# Patient Record
Sex: FEMALE | Race: AMERICAN INDIAN OR ALASKA NATIVE
[De-identification: names, ages, dates, MRNs, and addresses within clinical notes are randomized per-mention and may not be internally consistent; named-entity substitution may affect disease eponyms.]

---

## 2017-07-18 NOTE — EMERGENCY DEPARTMENT REPORT
ED ENT HPI





- General


Chief complaint: Dental/Oral


Stated complaint: L SIDE FACIAL PAIN/TOOTHACHE


Time Seen by Provider: 17 05:05


Source: patient


Mode of arrival: Ambulatory


Limitations: No Limitations





- History of Present Illness


Initial comments: 


26-year-old femalesignificant past medical history presents complaining of 

bilateral upper and lower toothache.  Patient states that she went to a dentist 

and was told that she has 4 wisdom teeth that need to be extracted.  Patient 

states she is taking Motrin 800 mg with minimal relief of her discomfort.  

Patient states she was prescribed amoxicillin today by her dentist.  Patient 

states that she has an appointment in September to have wisdom teeth extracted.

  Denies any pus or blood drainage from mouth states that pain is currently 7 

out of 10.  Patient speaking in full sentences.


MD complaint: tooth pain


Onset/Timin


-: week(s)


Location: tooth # (b/l upper and lower wisdom teeth. Pain behin teeth # 1. 16, 

32, 17)


Severity: moderate


Severity scale (0 -10): 6


Quality: aching


Worsens with: eating


Context- Dental: other (wisdom teeth)





- Related Data


 Previous Rx's











 Medication  Instructions  Recorded  Last Taken  Type


 


Sulfamethoxazole/Trimethoprim 1 each PO Q12H #14 tablet 14 Unknown Rx





[Bactrim Ds]    


 


Acetaminophen/Codeine [Tylenol 1 tab PO Q6H PRN #14 tab 17 Unknown Rx





/Codeine # 3 tab]    


 


Chlorhexidine Mouthwash [Peridex] 15 ml MM BID #1 bottle 17 Unknown Rx











 Allergies











Allergy/AdvReac Type Severity Reaction Status Date / Time


 


No Known Allergies Allergy   Unverified 14 13:35














ED Dental HPI





- General


Chief complaint: Dental/Oral


Stated complaint: L SIDE FACIAL PAIN/TOOTHACHE


Time Seen by Provider: 17 05:05


Source: patient


Mode of arrival: Ambulatory


Limitations: No Limitations





- Related Data


 Previous Rx's











 Medication  Instructions  Recorded  Last Taken  Type


 


Sulfamethoxazole/Trimethoprim 1 each PO Q12H #14 tablet 14 Unknown Rx





[Bactrim Ds]    


 


Acetaminophen/Codeine [Tylenol 1 tab PO Q6H PRN #14 tab 17 Unknown Rx





/Codeine # 3 tab]    


 


Chlorhexidine Mouthwash [Peridex] 15 ml MM BID #1 bottle 17 Unknown Rx











 Allergies











Allergy/AdvReac Type Severity Reaction Status Date / Time


 


No Known Allergies Allergy   Unverified 14 13:35














ED Review of Systems


ROS: 


Stated complaint: L SIDE FACIAL PAIN/TOOTHACHE


Other details as noted in HPI





Constitutional: denies: chills, fever


Eyes: denies: eye pain, eye discharge, vision change


ENT: dental pain.  denies: ear pain, throat pain


Respiratory: denies: cough, shortness of breath, wheezing


Cardiovascular: denies: chest pain, palpitations


Endocrine: no symptoms reported


Gastrointestinal: denies: abdominal pain, nausea, diarrhea


Genitourinary: denies: urgency, dysuria, discharge


Musculoskeletal: denies: back pain, joint swelling, arthralgia


Skin: denies: rash, lesions


Neurological: denies: headache, weakness, paresthesias


Psychiatric: denies: anxiety, depression


Hematological/Lymphatic: denies: easy bleeding, easy bruising





ED Past Medical Hx





- Past Medical History


Previous Medical History?: No





- Surgical History


Past Surgical History?: Yes


Additional Surgical History:  





- Social History


Smoking Status: Unknown if ever smoked





- Medications


Home Medications: 


 Home Medications











 Medication  Instructions  Recorded  Confirmed  Last Taken  Type


 


Sulfamethoxazole/Trimethoprim 1 each PO Q12H #14 tablet 14  Unknown Rx





[Bactrim Ds]     


 


Acetaminophen/Codeine [Tylenol 1 tab PO Q6H PRN #14 tab 17  Unknown Rx





/Codeine # 3 tab]     


 


Chlorhexidine Mouthwash [Peridex] 15 ml MM BID #1 bottle 17  Unknown Rx














ED Physical Exam





- General


Limitations: No Limitations


General appearance: alert, in no apparent distress





- Head


Head exam: Present: atraumatic, normocephalic





- Eye


Eye exam: Present: normal appearance, PERRL, EOMI





- ENT


ENT exam: Present: mucous membranes moist





- Expanded ENT Exam


  ** Expanded


Teeth exam: Present: dental tenderness # (pain behind all 4 molars)


Throat exam: Positive: normal inspection





- Neck


Neck exam: Present: normal inspection, full ROM





- Respiratory


Respiratory exam: Present: normal lung sounds bilaterally.  Absent: respiratory 

distress





- Cardiovascular


Cardiovascular Exam: Present: regular rate, normal rhythm.  Absent: systolic 

murmur, diastolic murmur, rubs, gallop





- GI/Abdominal


GI/Abdominal exam: Present: soft, normal bowel sounds





- Extremities Exam


Extremities exam: Present: normal inspection





- Back Exam


Back exam: Present: normal inspection





- Neurological Exam


Neurological exam: Present: alert, oriented X3





- Psychiatric


Psychiatric exam: Present: normal affect, normal mood





- Skin


Skin exam: Present: warm, dry, intact, normal color.  Absent: rash





ED Course





 Vital Signs











  17





  00:45


 


Temperature 98.5 F


 


Pulse Rate 77


 


Respiratory 16





Rate 


 


Blood Pressure 125/86


 


O2 Sat by Pulse 98





Oximetry 














ED Medical Decision Making





- Medical Decision Making


A/P: Toothache, wisdom teeth impaction


1-patient states she was ordered given a prescription for 10 day course of 

amoxicillin by her dentist


2-patient is taking 800 mg Motrin with minimal relief of her pain, will 

prescribe short course of Tylenol No. 3 with codeine


3-Peridex mouthwash


4- patient provided with a list of free dental clinics in [place] area


Critical care attestation.: 


If time is entered above; I have spent that time in minutes in the direct care 

of this critically ill patient, excluding procedure time.








ED Disposition


Clinical Impression: 


 Toothache





Disposition:  TO HOME OR SELFCARE


Is pt being admited?: No


Does the pt Need Aspirin: No


Condition: Stable


Instructions:  Benzocaine (By mouth), Toothache (ED)


Additional Instructions: 


[email or web address]


[email or web address]

[email or web address]+Directions


Prescriptions: 


Acetaminophen/Codeine [Tylenol /Codeine # 3 tab] 1 tab PO Q6H PRN #14 tab


 PRN Reason: Toothache


Chlorhexidine Mouthwash [Peridex] 15 ml MM BID #1 bottle


Referrals: 


[facility] [Outside] - 3-5 Days


Forms:  Work/School Release Form(ED)


Time of Disposition: 05:30

## 2018-04-29 ENCOUNTER — HOSPITAL ENCOUNTER (EMERGENCY)
Dept: HOSPITAL 5 - ED | Age: 28
Discharge: HOME | End: 2018-04-29
Payer: SELF-PAY

## 2018-04-29 VITALS — DIASTOLIC BLOOD PRESSURE: 89 MMHG | SYSTOLIC BLOOD PRESSURE: 130 MMHG

## 2018-04-29 DIAGNOSIS — J20.9: ICD-10-CM

## 2018-04-29 DIAGNOSIS — J01.90: Primary | ICD-10-CM

## 2018-04-29 DIAGNOSIS — F17.200: ICD-10-CM

## 2018-04-29 DIAGNOSIS — F12.10: ICD-10-CM

## 2018-04-29 DIAGNOSIS — Z86.2: ICD-10-CM

## 2018-04-29 PROCEDURE — 94640 AIRWAY INHALATION TREATMENT: CPT

## 2018-04-29 PROCEDURE — 71046 X-RAY EXAM CHEST 2 VIEWS: CPT

## 2018-04-29 PROCEDURE — 99283 EMERGENCY DEPT VISIT LOW MDM: CPT

## 2018-04-29 NOTE — EMERGENCY DEPARTMENT REPORT
HPI





- General


Chief Complaint: Upper Respiratory Infection


Time Seen by Provider: 18 19:30





- HPI


HPI: 





This is a 27-year-old female here complaining of congestion and body ache for 2 

months.  She said it started off with her having nasal congestion and runny 

nose with cold symptoms and cough and and turned into wheezing.  She says she 

is having some difficulty breathing and thinks it's related to mold in her 

apartment that she just recently moved into.  She said this started when she 

moved into an apartment and she thinks it has mold but she has not reported it.

  She is here with her child who also has upper respiratory symptoms.  Patient 

says she is having generalized aching in at 810.  She is taking over-the-

counter pain medication with minimal relief.  Denies any fever or chills.  

Denies any nausea or vomiting.  Denies any abdominal or back pain.  Denies any 

chest pain.  Denies any headache.  Reports that she feels like there is 

pressure in her face.  Denies any sore throat.  Patient blood pressure is 142/

101 without any history of high blood pressure.  She says she's been taking a 

lot of over-the-counter medication for cold and congestion.





ED Past Medical Hx





- Past Medical History


Previous Medical History?: Yes


Additional medical history: anemia, bronchitis





- Surgical History


Past Surgical History?: Yes


Additional Surgical History:  , IUD removed and another IUD placed





- Family History


Family history: diabetes





- Social History


Smoking Status: Current Every Day Smoker


Substance Use Type: Alcohol, Marijuana, Non Opiate Pain, Other





- Medications


Home Medications: 


 Home Medications











 Medication  Instructions  Recorded  Confirmed  Last Taken  Type


 


Sulfamethoxazole/Trimethoprim 1 each PO Q12H #14 tablet 14  Unknown Rx





[Bactrim Ds]     


 


Acetaminophen/Codeine [Tylenol 1 tab PO Q6H PRN #14 tab 17  Unknown Rx





/Codeine # 3 tab]     


 


Chlorhexidine Mouthwash [Peridex] 15 ml MM BID #1 bottle 17  Unknown Rx


 


ALBUTEROL Inhaler [ProAir HFA 2 puff IH Q6H PRN #1 inhalation 18  Unknown 

Rx





Inhaler]     


 


Acetaminophen 500 mg PO Q6H PRN #12 tablet 18  Unknown Rx


 


Amoxicillin/K Clav Tab [Augmentin 1 tab PO Q12HR 10 Days #20 tab 18  

Unknown Rx





875 mg]     


 


Cetirizine HCl [ZyrTEC] 10 mg PO QAM 14 Days #14 capsule 18  Unknown Rx


 


Fluticasone [Flonase] 1 spray NS QDAY 14 Days #1 bottle 18  Unknown Rx


 


Prednisone 50 mg PO QAM 5 Days #5 tablet 18  Unknown Rx














ED Review of Systems


ROS: 


Stated complaint: CONGESTION,BODY ACHES,COUGH


Other details as noted in HPI





Comment: All other systems reviewed and negative


Constitutional: no symptoms reported


Eyes: denies: eye pain, eye discharge


ENT: denies: ear pain, throat pain


Respiratory: cough, shortness of breath, SOB with exertion, other (patient 

believes that she is exposed to mold).  denies: orthopnea, SOB at rest, stridor

, wheezing


Cardiovascular: denies: chest pain, palpitations, dyspnea on exertion, orthopnea

, edema, syncope, paroxysmal nocturnal dyspnea


Gastrointestinal: denies: abdominal pain, nausea, vomiting, diarrhea, 

constipation, hematemesis, melena, hematochezia


Genitourinary: denies: urgency, dysuria, frequency, hematuria, discharge, 

abnormal menses, dyspareunia


Musculoskeletal: myalgia.  denies: back pain, joint swelling, arthralgia


Skin: denies: rash


Neurological: denies: headache, abnormal gait, vertigo





Physical Exam





- Physical Exam


Vital Signs: 


 Vital Signs











  18





  18:19


 


Temperature 98.3 F


 


Pulse Rate 94 H


 


Respiratory 18





Rate 


 


Blood Pressure 142/101


 


O2 Sat by Pulse 97





Oximetry 








 Vital Signs











  18





  18:19 21:30 21:45


 


Temperature 98.3 F  


 


Pulse Rate 94 H  


 


Pulse Rate [  88 95 H





Posterior]   


 


Respiratory 18  





Rate   


 


Respiratory  16 20





Rate [Posterior   





]   


 


Blood Pressure 142/101  


 


Blood Pressure   





[Right]   


 


O2 Sat by Pulse 97  





Oximetry   














  18





  21:51


 


Temperature 


 


Pulse Rate 85


 


Pulse Rate [ 





Posterior] 


 


Respiratory 18





Rate 


 


Respiratory 





Rate [Posterior 





] 


 


Blood Pressure 


 


Blood Pressure 144/94





[Right] 


 


O2 Sat by Pulse 





Oximetry 











General: 





This is 27-year-old female well-nourished well-developed in no acute distress


Physical Exam: 





Head: Normocephalic atraumatic


Ears:BIateral TM congested without erythema and loss of bony landmarks.  Kris 

EAC with normal exam.  No mastoid bone tenderness.


Mouth: Moist, no pharyngeal erythema or exudate .    UVULA midline and oral 

airways patent. No peritonsillar abscess.  No drooling noted.


Neck: Nontender to palpate, supple, normal range of motion. No adenopathy. No c-

spine tenderness.  


 Nose: Bilateral nasal mucosa congested with clear drainage.  Maxillary and 

frontal sinuses tender to palpate. 


 Eyes: Bilateral Sclerae and  conjunctiva without injection.  Bilateral pupils 

equal and reactive to light.  Bilateral lids are normal.    Normal 

accommodation.BEOMI


Lungs: Scattered wheezes in the upper lung fields.  No rhonchi or rales.  Dry 

cough, Normal work of breathing and no chest wall tenderness


Extremity: No clubbing, cyanosis or edema.  +2 pulses to all extremities and no 

neurovascular compromise


Abdomen: Soft, nontender to palpate noted by no facial grimacing.  No 

distention or rigidity.  Normal bowel sounds in all quadrants .  Patient does 

not cry with tapping of bilateral flank.


CV: S1, S2.  Regular rate,Regular  rhythm negative murmur.  Capillary refill is 

less than 3 seconds


Skin: Clean dry and intact, no rashes or lesions


Psych: Normal mood and behavior 





ED Course


 Vital Signs











  18





  18:19


 


Temperature 98.3 F


 


Pulse Rate 94 H


 


Respiratory 18





Rate 


 


Blood Pressure 142/101


 


O2 Sat by Pulse 97





Oximetry 














- Reevaluation(s)


Reevaluation #1: 





18 23:36


Patient given Xopenex 0.63 mg, Atrovent 0.5 mg nebulizer and Deltasone 60 mg.  

Upon reevaluation, her lung sounds are clear and she says she is feeling 

better.  Received clonidine 0.1 mg by mouth for elevated blood pressure and 

Motrin 800 mg by mouth for musculoskeletal pain.  Pain is better and the blood 

pressure is better.  





ED Medical Decision Making





- Radiology Data


Radiology results: report reviewed, image reviewed


interpreted by me: 





Chest images reviewed by myself and Dr. Prieto and patient does not have any 

acute cardiopulmonary findings but final reading to be done by radiologist.





- Medical Decision Making





ED course: Patient and here with cough and congestion, acute sinusitis and 

bronchitis.  Musculoskeletal pain.  She also had elevated blood pressure 

suspect from taking multiple over-the-counter  decongestion.  Patient and was 

given medication in emergency room for blood pressure and nebulizer treatments 

for wheezing to lungs.  Her lung sounds are clear and she says she feels 

better.  She was given blood pressure medication and blood pressure is better.  

I discussed patient that she needs to talk with her landlord regarding mold in 

apartment and she needs to remove herself from area.  I also discuss her that 

she can call the health department to report this if landlord does not relocate 

her to area that is mold free.  I discussed patient's chest x-ray results which 

did not have any negative findings on films when reviewed by myself and Dr. Prieto.  I also discussed with her diagnosis and treatment plan and that she 

needs to follow up with primary care physician which she does not have one so I 

told her she is to follow-up at [facility] in 2-3 days.  Patient was 

undescended discharge instruction and treatment plan and discharged home with 

prescription for Tessalon Perle, albuterol, Tylenol plain, Augmentin, Zyrtec 

and Flonase.


Critical care attestation.: 


If time is entered above; I have spent that time in minutes in the direct care 

of this critically ill patient, excluding procedure time.








ED Disposition


Clinical Impression: 


 Suspected exposure to mold, Cough in adult, Musculoskeletal pain





Acute sinusitis


Qualifiers:


 Sinusitis location: unspecified location Recurrence: not specified as 

recurrent Qualified Code(s): J01.90 - Acute sinusitis, unspecified





Acute bronchitis


Qualifiers:


 Bronchitis organism: unspecified organism Qualified Code(s): J20.9 - Acute 

bronchitis, unspecified





Disposition: DC-01 TO HOME OR SELFCARE


Is pt being admited?: No


Does the pt Need Aspirin: No


Condition: Stable


Instructions:  Acute Bronchitis (ED), Sinusitis (ED), Acute Cough (ED), How to 

Stop Smoking (ED), Hypertension (ED), How to Take a Blood Pressure  (ED), 

Musculoskeletal Pain (ED)


Additional Instructions: 


Please increase her fluid intake


Flush nostrils with saline nasal spray 


take antibiotic as prescribed


Take albuterol inhaler as prescribed


Take Tylenol for pain


F/U  with primary care physician as instructed


Take Zyrtec and Flonase for congestion


A Tessalon Perles for cough


Few suspect there is mold in your apartment, he will need to reported to 

landlord and if he does not move you to another apartment that is small free 

then he needs to call the health department.


Prescriptions: 


Acetaminophen 500 mg PO Q6H PRN #12 tablet


 PRN Reason: musculoskeletal pain


ALBUTEROL Inhaler [ProAir HFA Inhaler] 2 puff IH Q6H PRN #1 inhalation


 PRN Reason: cough and wheezing


Amoxicillin/K Clav Tab [Augmentin 875 mg] 1 tab PO Q12HR 10 Days #20 tab


Cetirizine HCl [ZyrTEC] 10 mg PO QAM 14 Days #14 capsule


Fluticasone [Flonase] 1 spray NS QDAY 14 Days #1 bottle


Prednisone 50 mg PO QAM 5 Days #5 tablet


Referrals: 


PRIMARY CARE,MD [Primary Care Provider] - 2-3 Days


[facility] [Outside] - 2-3 Days


Forms:  Work/School Release Form(ED)

## 2018-04-30 NOTE — XRAY REPORT
FINAL REPORT



PROCEDURE:  XR CHEST ROUTINE 2V



TECHNIQUE:  PA and lateral chest radiographs were obtained. CPT

66010







HISTORY:  cough and congestion x 2 months 



COMPARISON:  No prior studies are available for comparison.



FINDINGS:  

Heart: Normal.



Mediastinum/Vessels: Normal.



Lungs/Pleural space: Normal.



Bony thorax: No acute osseous abnormality.



Other: 



IMPRESSION:  

Normal examination.

## 2021-03-29 ENCOUNTER — HOSPITAL ENCOUNTER (EMERGENCY)
Dept: HOSPITAL 5 - ED | Age: 31
Discharge: HOME | End: 2021-03-29
Payer: MEDICAID

## 2021-03-29 VITALS — DIASTOLIC BLOOD PRESSURE: 100 MMHG | SYSTOLIC BLOOD PRESSURE: 130 MMHG

## 2021-03-29 DIAGNOSIS — Z79.899: ICD-10-CM
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
## 2021-03-29 DIAGNOSIS — F12.10: ICD-10-CM
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
## 2021-03-29 DIAGNOSIS — S46.811A: Primary | ICD-10-CM

## 2021-03-29 PROCEDURE — 99282 EMERGENCY DEPT VISIT SF MDM: CPT

## 2021-03-29 NOTE — EMERGENCY DEPARTMENT REPORT
ED Extremity Problem HPI





- General


Chief complaint: Extremity Injury, Upper


Stated complaint: RT SHOULDER PAIN


Time Seen by Provider: 21 14:32


Source: patient


Mode of arrival: Ambulatory


Limitations: No Limitations





- History of Present Illness


Initial comments: 





Patient is a 30-year-old female presents emergency room complaints of right 

shoulder pain that began yesterday morning when she woke up.  She states that 

she believes she may have slept wrong.  She denies any fall or injury.  She 

states that she works as a  and frequently does repetitive 

movements and heavy lifting.  She states that her pain is worse with movement 

and lifting her arm above her head.  She denies ever injuring this shoulder in 

the past.  She denies any numbness, weakness, swelling, redness, fever, any 

other symptoms.  No past medical history.  No allergies to medications.  Last 

menstrual cycle 3/14/2021.





- Related Data


                                  Previous Rx's











 Medication  Instructions  Recorded  Last Taken  Type


 


Amoxicillin 500 mg PO BID #20 capsule 18 Unknown Rx


 


Fluticasone [Flonase] 1 spray NS QDAY #1 bottle 18 Unknown Rx


 


methylPREDNISolone [Medrol] 4 mg PO DAILY #1 tab.ds.pk 18 Unknown Rx


 


predniSONE [Deltasone] 20 mg PO DAILY #5 tablet 18 Unknown Rx


 


Menthol/Camphor [Tiger Balm 1 applicatio TP BID #18 oint...g. 21 Unknown 

Rx





Ointment]    


 


Naproxen [EC-Naprosyn] 500 mg PO BID PRN #14 tablet.dr 21 Unknown Rx


 


methOCARBAMOL [Robaxin TAB] 500 mg PO BID PRN #14 tab 21 Unknown Rx











                                    Allergies











Allergy/AdvReac Type Severity Reaction Status Date / Time


 


No Known Allergies Allergy   Verified 21 14:02














ED Review of Systems


ROS: 


Stated complaint: RT SHOULDER PAIN


Other details as noted in HPI





Comment: All other systems reviewed and negative





ED Past Medical Hx





- Past Medical History


Additional medical history: anemia, bronchitis/SCOILOSIS





- Surgical History


Additional Surgical History:  , IUD removed and another IUD placed





- Social History


Smoking Status: Never Smoker


Substance Use Type: Alcohol, Marijuana





- Medications


Home Medications: 


                                Home Medications











 Medication  Instructions  Recorded  Confirmed  Last Taken  Type


 


Amoxicillin 500 mg PO BID #20 capsule 18  Unknown Rx


 


Fluticasone [Flonase] 1 spray NS QDAY #1 bottle 18  Unknown Rx


 


methylPREDNISolone [Medrol] 4 mg PO DAILY #1 tab.ds.pk 18  Unknown Rx


 


predniSONE [Deltasone] 20 mg PO DAILY #5 tablet 18  Unknown Rx


 


Menthol/Camphor [Tiger Balm 1 applicatio TP BID #18 oint...g. 21  Unknown 

Rx





Ointment]     


 


Naproxen [EC-Naprosyn] 500 mg PO BID PRN #14 tablet.dr 21  Unknown Rx


 


methOCARBAMOL [Robaxin TAB] 500 mg PO BID PRN #14 tab 21  Unknown Rx














ED Physical Exam





- General


Limitations: No Limitations


General appearance: alert, in no apparent distress





- Head


Head exam: Present: atraumatic, normocephalic





- Eye


Eye exam: Present: normal appearance





- ENT


ENT exam: Present: mucous membranes moist





- Respiratory


Respiratory exam: Absent: respiratory distress, accessory muscle use





- Extremities Exam


Extremities exam: Present: other (ttp to the right posterior trapezius, no bony 

ttp of the RUE, FROM of the RUE, no deformity, no edema, no ecchymosis, no 

increased warmth, no erythema, clavicles are equal, no clavicular ttp, 

neurovascularly intact)





- Neurological Exam


Neurological exam: Present: alert, oriented X3





- Psychiatric


Psychiatric exam: Present: normal affect, normal mood





- Skin


Skin exam: Present: warm, dry, intact





ED Course


                                   Vital Signs











  21





  14:01


 


Temperature 98.6 F


 


Pulse Rate 82


 


Respiratory 20





Rate 


 


Blood Pressure 130/100


 


O2 Sat by Pulse 100





Oximetry 














ED Medical Decision Making





- Medical Decision Making





Patient is a 30-year-old female presents emergency room complaints of right 

shoulder pain that began yesterday morning when she woke up.  She states that 

she believes she may have slept wrong.  She denies any fall or injury.  She 

states that she works as a  and frequently does repetitive 

movements and heavy lifting.  She states that her pain is worse with movement 

and lifting her arm above her head.  She denies ever injuring this shoulder in 

the past.  She denies any numbness, weakness, swelling, redness, fever, any 

other symptoms.  No past medical history.  No allergies to medications.  Last 

menstrual cycle 3/14/2021. VSS. on exam:ttp to the right posterior trapezius, no

bony ttp of the RUE, FROM of the RUE, no deformity, no edema, no ecchymosis, no 

increased warmth, no erythema, clavicles are equal, no clavicular ttp, 

neurovascularly intact.  She has point tenderness to palpation over the 

trapezius muscle.  Symptoms most likely consistent with trapezius strain.  She 

has had no acute traumatic injury.  Patient given prescription for naproxen, 

Robaxin, Tiger balm ointment.  Advised patient Please use medication as 

prescribed.  Do not drive or operate heavy machinery while using muscle relaxer 

Robaxin.  May use ice pack, heating pad, rest, and epsom salt bath.  Follow-up 

with orthopedic doctor if symptoms or not improving.  Return to emergency room 

for any new or worsening symptoms.


Critical care attestation.: 


If time is entered above; I have spent that time in minutes in the direct care 

of this critically ill patient, excluding procedure time.








ED Disposition


Clinical Impression: 


Strain of trapezius muscle


Qualifiers:


 Encounter type: initial encounter Laterality: right Qualified Code(s): S46.811A

- Strain of other muscles, fascia and tendons at shoulder and upper arm level, 

right arm, initial encounter





Disposition: - TO HOME OR SELFCARE


Is pt being admited?: No


Does the pt Need Aspirin: No


Condition: Stable


Instructions:  Muscle Strain


Additional Instructions: 


Please use medication as prescribed.  Do not drive or operate heavy machinery 

while using muscle relaxer Robaxin.  May use ice pack, heating pad, rest, and 

epsom salt bath.  Follow-up with orthopedic doctor if symptoms or not improving.

 Return to emergency room for any new or worsening symptoms.


Prescriptions: 


Naproxen [EC-Naprosyn] 500 mg PO BID PRN #14 tablet.


 PRN Reason: pain


methOCARBAMOL [Robaxin TAB] 500 mg PO BID PRN #14 tab


 PRN Reason: pain


Menthol/Camphor [Tiger Balm Ointment] 1 applicatio TP BID #18 oint...g.


Referrals: 


JOSE G ABRAHAM MD [Staff Physician] - 3-5 Days


[facility] [Provider Group] - 3-5 Days


Time of Disposition: 14:41


Print Language: ENGLISH

## 2021-12-31 ENCOUNTER — HOSPITAL ENCOUNTER (EMERGENCY)
Dept: HOSPITAL 5 - ED | Age: 31
LOS: 1 days | Discharge: HOME | End: 2022-01-01
Payer: MEDICAID

## 2021-12-31 DIAGNOSIS — F12.90: ICD-10-CM

## 2021-12-31 DIAGNOSIS — Z72.89: ICD-10-CM

## 2021-12-31 DIAGNOSIS — Z3A.28: ICD-10-CM

## 2021-12-31 DIAGNOSIS — B34.9: ICD-10-CM

## 2021-12-31 DIAGNOSIS — Z79.899: ICD-10-CM

## 2021-12-31 DIAGNOSIS — O21.9: ICD-10-CM

## 2021-12-31 DIAGNOSIS — Z20.822: ICD-10-CM

## 2021-12-31 DIAGNOSIS — O26.893: Primary | ICD-10-CM

## 2021-12-31 PROCEDURE — 99282 EMERGENCY DEPT VISIT SF MDM: CPT

## 2022-01-01 VITALS — SYSTOLIC BLOOD PRESSURE: 114 MMHG | DIASTOLIC BLOOD PRESSURE: 66 MMHG

## 2022-01-01 NOTE — EMERGENCY DEPARTMENT REPORT
ED General Adult HPI





- General


Chief complaint: Pain General


Stated complaint: CHEST PAINS


Time Seen by Provider: 21 23:00


Source: patient


Mode of arrival: Ambulatory


Limitations: No Limitations





- History of Present Illness


Initial comments: 





Patient presents with generalized malaise and body aches.  She has had nausea 

and vomiting.  She has had known coronavirus exposure.  She is concerned that 

she likely has coronavirus.  Multiple family emerged have this.  She is here 

because she just does not feel well.  She is also 28 weeks pregnant.  She has 

not been able to keep liquids down.  She states she cannot eat solid food.  She 

just keeps vomiting.  There is no hematemesis or coffee-ground emesis.  Has no 

melenic stool.  Patient has no dysuria or frequency.  She is still feeling the 

baby move.  There has been no leakage of fluid.  Because of her concern for 

coronavirus and vomiting, she came here.  This started a couple of days ago.





- Related Data


                                  Previous Rx's











 Medication  Instructions  Recorded  Last Taken  Type


 


Amoxicillin 500 mg PO BID #20 capsule 18 Unknown Rx


 


Fluticasone [Flonase] 1 spray NS QDAY #1 bottle 18 Unknown Rx


 


methylPREDNISolone [Medrol] 4 mg PO DAILY #1 tab.ds.pk 18 Unknown Rx


 


predniSONE [Deltasone] 20 mg PO DAILY #5 tablet 18 Unknown Rx


 


Menthol/Camphor [Tiger Balm 1 applicatio TP BID #18 oint...g. 21 Unknown 

Rx





Ointment]    


 


methOCARBAMOL [Robaxin TAB] 500 mg PO BID PRN #14 tab 21 Unknown Rx


 


Metoclopramide [Reglan] 10 mg PO TID PRN #60 tab 22 Unknown Rx











                                    Allergies











Allergy/AdvReac Type Severity Reaction Status Date / Time


 


No Known Allergies Allergy   Verified 21 14:02














ED Review of Systems


ROS: 


Stated complaint: CHEST PAINS


Other details as noted in HPI





Comment: All other systems reviewed and negative


Constitutional: denies: weakness


Eyes: denies: eye pain


ENT: denies: throat pain


Respiratory: see HPI


Cardiovascular: denies: chest pain


Endocrine: denies: unexplained weight loss


Gastrointestinal: as per HPI


Genitourinary: denies: dysuria


Musculoskeletal: denies: back pain


Skin: denies: rash


Neurological: denies: headache


Hematological/Lymphatic: denies: easy bruising





ED Past Medical Hx





- Past Medical History


Previous Medical History?: Yes


Additional medical history: anemia, bronchitis/SCOILOSIS, gestational DIABETES





- Surgical History


Past Surgical History?: No


Additional Surgical History:  , IUD removed and another IUD placed





- Family History


Family history: no significant





- Social History


Smoking Status: Never Smoker


Substance Use Type: Alcohol, Marijuana





- Medications


Home Medications: 


                                Home Medications











 Medication  Instructions  Recorded  Confirmed  Last Taken  Type


 


Amoxicillin 500 mg PO BID #20 capsule 18  Unknown Rx


 


Fluticasone [Flonase] 1 spray NS QDAY #1 bottle 18  Unknown Rx


 


methylPREDNISolone [Medrol] 4 mg PO DAILY #1 tab.ds.pk 18  Unknown Rx


 


predniSONE [Deltasone] 20 mg PO DAILY #5 tablet 18  Unknown Rx


 


Menthol/Camphor [Tiger Balm 1 applicatio TP BID #18 oint...g. 21  Unknown 

Rx





Ointment]     


 


methOCARBAMOL [Robaxin TAB] 500 mg PO BID PRN #14 tab 21  Unknown Rx


 


Metoclopramide [Reglan] 10 mg PO TID PRN #60 tab 22  Unknown Rx














ED Physical Exam





- General


Limitations: No Limitations, Other


General appearance: alert (Pulse ox noted and normal), in no apparent distress





- Head


Head exam: Present: atraumatic, normocephalic





- Eye


Eye exam: Present: normal appearance, PERRL, EOMI.  Absent: scleral icterus





- ENT


ENT exam: Present: mucous membranes dry, normal external ear exam





- Neck


Neck exam: Present: normal inspection.  Absent: meningismus





- Respiratory


Respiratory exam: Present: normal lung sounds bilaterally.  Absent: respiratory 

distress





- Cardiovascular


Cardiovascular Exam: Present: regular rate, normal rhythm





- GI/Abdominal


GI/Abdominal exam: Present: soft.  Absent: distended, tenderness





- Extremities Exam


Extremities exam: Present: normal capillary refill





- Back Exam


Back exam: Absent: CVA tenderness (R), CVA tenderness (L)





- Neurological Exam


Neurological exam: Present: alert, oriented X3.  Absent: motor sensory deficit





- Psychiatric


Psychiatric exam: Present: normal affect, normal mood





- Skin


Skin exam: Present: warm, dry





ED Course





                                   Vital Signs











  12/31/21





  22:35


 


Temperature 98.7 F


 


Pulse Rate 97 H


 


Respiratory 18





Rate 


 


Blood Pressure 118/73


 


O2 Sat by Pulse 99





Oximetry 














- Reevaluation(s)


Reevaluation #1: 





22 00:33


Patient was tolerating orals after medication and was discharged.





ED Medical Decision Making





- Medical Decision Making





Patient present with a viral constellation of symptoms as well as known exposure

 to coronavirus.  She does have vomiting which has been controlled medication.  

She has no complaints regarding vaginal bleeding or discharge.  She is still 

having good fetal movement.  I do not believe this represents a direct 

complication from the pregnancy.  She very well could have coronavirus but does 

not require immediate testing.  She was treated symptomatically and referred to 

outpatient evaluation follow-up.


Critical Care Time: No


Critical care attestation.: 


If time is entered above; I have spent that time in minutes in the direct care 

of this critically ill patient, excluding procedure time.








ED Disposition


Clinical Impression: 


 Viral syndrome, Suspected COVID-19 virus infection





Nausea & vomiting


Qualifiers:


 Vomiting type: unspecified Qualified Code(s): R11.2 - Nausea with vomiting, 

unspecified





Disposition: 01 HOME / SELF CARE / HOMELESS


Is pt being admited?: No


Condition: Stable


Instructions:  Viral Respiratory Infection, Easy-To-Read, Hand Washing, 

Easy-to-Read, Nausea and Vomiting, Adult, Easy-to-Read


Additional Instructions: 


Drink plenty water.  Have a bland diet.  Return for problems.  Follow up with 

your regular doctor for recheck.


Prescriptions: 


Metoclopramide [Reglan] 10 mg PO TID PRN #60 tab


 PRN Reason: Nausea


Referrals: 


PRIMARY CARE,MD [Primary Care Provider] - 3-5 Days